# Patient Record
Sex: FEMALE
[De-identification: names, ages, dates, MRNs, and addresses within clinical notes are randomized per-mention and may not be internally consistent; named-entity substitution may affect disease eponyms.]

---

## 2020-08-18 ENCOUNTER — APPOINTMENT (OUTPATIENT)
Dept: ORTHOPEDIC SURGERY | Facility: CLINIC | Age: 70
End: 2020-08-18
Payer: MEDICARE

## 2020-08-18 DIAGNOSIS — M16.11 UNILATERAL PRIMARY OSTEOARTHRITIS, RIGHT HIP: ICD-10-CM

## 2020-08-18 DIAGNOSIS — M16.12 UNILATERAL PRIMARY OSTEOARTHRITIS, LEFT HIP: ICD-10-CM

## 2020-08-18 PROBLEM — Z00.00 ENCOUNTER FOR PREVENTIVE HEALTH EXAMINATION: Status: ACTIVE | Noted: 2020-08-18

## 2020-08-18 PROCEDURE — 99203 OFFICE O/P NEW LOW 30 MIN: CPT

## 2020-08-21 NOTE — PHYSICAL EXAM
[de-identified] : No imaging today.\par  [de-identified] : General: Not in acute distress, dressed appropriately, sitting on examination table\par Skin: Warm and dry, normal turgor, no rashes\par Neurological: AOx3, Cranial nerves grossly in tact\par Psych: Mood and affect appropriate\par \par Right Hip: No swelling edema erythema redness or drainage. Tender anteriorly. ROM: Hip flexion 120, abduction 30, int/ext rotation 45. Painful range of motion. 5/5 strength. Normal gait. \par \par Left Hip: No swelling edema erythema redness or drainage. Tender anteriorly. ROM: Hip flexion 120, abduction 30, int/ext rotation 45. Painful range of motion. 5/5 strength. Normal gait.

## 2020-08-21 NOTE — END OF VISIT
[FreeTextEntry3] : By signing my name below, I, Uma Montemayor, attest that this documentation has been prepared under the direction and in the presence of Lavelle Graham PA-C.\par

## 2020-08-21 NOTE — HISTORY OF PRESENT ILLNESS
[de-identified] : 71 y/o F, here for initial visit of bilateral hip pain. Complaining of pain and stiffness in her hips. Describes pain as a cramping sensation. Pain is worse with walking, bending, and daily living activities, better with rest. Pt went to a orthopedic office 2 months ago where she had x-rays done. Pt wants to schedule a bilateral THR at the end of August. \par

## 2020-08-21 NOTE — ASSESSMENT
[FreeTextEntry1] : Assessment\par Bilateral hip OA\par \par Plan\par Will schedule staged left and right hip replacements\par \par Patient will benefit from the proposed procedure, she has failed a conservative treatment plan of supervised physical therapy, anti-inflammatory medications, and activity modification. She continues to have pain impacting her ability to perform ADL's and has a decreasing QoL. We will schedule this for the earliest mutually convenient time after the appropriate pre-op laboratory tests and clearances are obtained.  All questions were answered and a thorough explanation of RBA were given\par \par F/U when schedules for surgery \par \par All medical record entries made by the PA/Donibkevon/Fellow are at my, Dr. Cristian Marinelli's direction and personally dictated by me on 08/18/2020]. I have reviewed the chart and agree that the record accurately reflects my personal performance of the history, physical exam, assessment, and plan. I have also personally directed reviewed, and agreed with the chart.\par

## 2020-09-16 ENCOUNTER — APPOINTMENT (OUTPATIENT)
Dept: ORTHOPEDIC SURGERY | Facility: CLINIC | Age: 70
End: 2020-09-16
Payer: SUBSIDIZED

## 2020-09-16 PROCEDURE — ZZZZZ: CPT

## 2022-11-09 PROBLEM — Z87.39 HISTORY OF ARTHRITIS: Status: RESOLVED | Noted: 2022-11-09 | Resolved: 2022-11-09

## 2022-11-09 PROBLEM — R22.2 MASS, CHEST: Status: ACTIVE | Noted: 2022-11-09

## 2022-11-10 ENCOUNTER — APPOINTMENT (OUTPATIENT)
Dept: THORACIC SURGERY | Facility: CLINIC | Age: 72
End: 2022-11-10

## 2022-11-10 ENCOUNTER — NON-APPOINTMENT (OUTPATIENT)
Age: 72
End: 2022-11-10

## 2022-11-10 VITALS
OXYGEN SATURATION: 97 % | WEIGHT: 140 LBS | HEART RATE: 64 BPM | DIASTOLIC BLOOD PRESSURE: 70 MMHG | HEIGHT: 63 IN | RESPIRATION RATE: 17 BRPM | BODY MASS INDEX: 24.8 KG/M2 | SYSTOLIC BLOOD PRESSURE: 162 MMHG | TEMPERATURE: 97.3 F

## 2022-11-10 DIAGNOSIS — R22.2 LOCALIZED SWELLING, MASS AND LUMP, TRUNK: ICD-10-CM

## 2022-11-10 DIAGNOSIS — K44.9 DIAPHRAGMATIC HERNIA W/OUT OBSTRUCTION OR GANGRENE: ICD-10-CM

## 2022-11-10 DIAGNOSIS — K21.9 DIAPHRAGMATIC HERNIA W/OUT OBSTRUCTION OR GANGRENE: ICD-10-CM

## 2022-11-10 DIAGNOSIS — Z87.39 PERSONAL HISTORY OF OTHER DISEASES OF THE MUSCULOSKELETAL SYSTEM AND CONNECTIVE TISSUE: ICD-10-CM

## 2022-11-10 PROCEDURE — 99203 OFFICE O/P NEW LOW 30 MIN: CPT

## 2022-11-14 NOTE — ASSESSMENT
[FreeTextEntry1] : A 72 year old Female, with PMHX of arthritis, who is referred by Dr.Spyros Abernathy for an initial visit of an mass in the right costophrenic angle.\par \par The chest imaging was reviewed with the patient. \par \par (Coronary CT chest on 10/31/22\par -There is partially imaged soft tissue mass with calcifications in the region of the right costophrenic angle, which measures 5.5 X 1.9 cm in its imaged portion.\par -Moderate size hiatal hernia and findings suggestive of gastroesophageal reflux.)\par \par The etiology is unclear but likely presents a benign process given the degree of calcification. We will obtain a formal chest CT to better assess the lesion. She will RTC after her chest CT with a telehealth visit if she so chooses. \par \par I also discussed the surgical treatment of hiatal hernia with the patient, including preoperative evaluation such as EGD, Manometry, and Bravo test. Will continue observe at this time. \par \par Plan:\par \par 1. Chest CT without contrast\par 2. In person or Telehealth Visit\par \par VOLODYMYR Parks, was scribe [and  if needed] for and in the presence of Dr. Leticia Donohue for the following sections: history of present illness, past medical/surgical/family/social/ allergy history, review of systems, vital signs, physical exam, and disposition.\par \par Leticia GLEZ MD personally performed the services described in the documentation, reviewed the documentation recorded by the scribe in my presence and it accurately and completely records my words and actions. I have personally seen, examined, and participated in the care of this patient.  I have reviewed all pertinent clinical information, including history and physical exam and plan.  I agree with the above history, physical and plan of the ACP which I have reviewed and edited where appropriate.\par \par

## 2022-11-14 NOTE — HISTORY OF PRESENT ILLNESS
[FreeTextEntry1] : A 72 year old Female, nonsmoker, with PMHX of arthritis, s/p bilateral hip replacement, who is referred by  Dr. Avelina Abernathy for an initial visit of incidental found right costophrenic angle mass on CT CCTA. \par \par Patient had vague chest pain, occasional pressure RUQ of abdomen, cardiac work up revealed abnormal chest. She also admits chronic acid reflux. Denies cough, dyspnea. \par \par CT CCTA on 10/31/22\par -There is partially imaged soft tissue mass with calcifications in the region of the right costophrenic angle, which measures 5.5 X 1.9 cm in its imaged portion.\par -Moderate size hiatal hernia and findings suggestive of gastroesophageal reflux.

## 2022-11-14 NOTE — PHYSICAL EXAM
[General Appearance - Alert] : alert [General Appearance - In No Acute Distress] : in no acute distress [Neck Appearance] : the appearance of the neck was normal [Neck Cervical Mass (___cm)] : no neck mass was observed [Jugular Venous Distention Increased] : there was no jugular-venous distention [Thyroid Diffuse Enlargement] : the thyroid was not enlarged [Thyroid Nodule] : there were no palpable thyroid nodules [] : no respiratory distress [Auscultation Breath Sounds / Voice Sounds] : lungs were clear to auscultation bilaterally [Heart Rate And Rhythm] : heart rate was normal and rhythm regular [Heart Sounds] : normal S1 and S2 [Heart Sounds Gallop] : no gallops [Murmurs] : no murmurs [Heart Sounds Pericardial Friction Rub] : no pericardial rub [Deep Tendon Reflexes (DTR)] : deep tendon reflexes were 2+ and symmetric [Sensation] : the sensory exam was normal to light touch and pinprick [No Focal Deficits] : no focal deficits

## 2022-11-14 NOTE — REVIEW OF SYSTEMS
[As Noted in HPI] : as noted in HPI [Heartburn] : heartburn [Negative] : Endocrine [Vomiting] : no vomiting

## 2022-11-29 ENCOUNTER — APPOINTMENT (OUTPATIENT)
Dept: THORACIC SURGERY | Facility: CLINIC | Age: 72
End: 2022-11-29

## 2022-11-29 PROCEDURE — 99442: CPT

## 2022-11-30 NOTE — DATA REVIEWED
[FreeTextEntry1] : Coronary CT chest on 10/31/22\par -There is partially imaged soft tissue mass with calcifications in the region of the right costophrenic angle, which measures 5.5 X 1.9 cm in its imaged portion.\par -Moderate size hiatal hernia and findings suggestive of gastroesophageal reflux.

## 2022-11-30 NOTE — HISTORY OF PRESENT ILLNESS
[FreeTextEntry1] : This visit was provided via telephone. The patient was located at home at the time of the visit. \par The provider, PAVITHRA POWERS, was located at the medical office located in  at the time of the visit. The patient and Provider participated in the telehealth encounter.\par Verbal consent for telephone services was given by the patient.\par \par 72  year old female, with PMHX of arthritis, who was referred by Dr. Avelina Abernathy for evaluation of a mass in the right costophrenic angle found on CT CCTA. \par \par She presents today via telehealth converted to telephone (due to lack of WIFI for patient) to review the results of a recent chest CT done to better assess the lesion. \par \par (CT chest 11/22/22:\par -6.3 cm x 2.4 x 1.7cm partially calcified subpleural mass anteriorly w/in the right middle lobe in the right cardiophrenic region . this corresponds to the findings on the recent CT coronary angiogram study. \par Differential diagnosis- pleural fibroma, mesothelioma and pleural thymoma. \par The large size of this mass would be atypical for asbestos related to pleural disease and no typical appearing pleural plaques are seen elsewhere in the lung. Can be further evaluated by PET scan. mass is also amenable to CT guided biopsy. Alternatively a follow up imaging with a repeat CT scan of the chest in 3-4 months could be preformed to monitor for stability. \par \par Few small noncalcified and calcified lung nodules with the largest noncalcified nodule a 4 mm nodule within the lateral right middle lobe.\par \par Moderate sized hiatal hernia\par \par 2.4cm x 2cm right adrenal gland adenoma).\par \par She has no complaints. She is to undergo a colonoscopy in the next month. \par \par \par \par

## 2022-11-30 NOTE — ASSESSMENT
[FreeTextEntry1] : 72 year old female, with PMHX of arthritis, who was referred by Dr. Avelina Abernathy for evaluation of a mass in the right costophrenic angle found on CT CCTA. \par \par She presents today via telehealth converted to telephone (due to lack of WIFI for patient) to review the results of a recent chest CT done to better assess the lesion. \par \par (CT chest 11/22/22:\par -6.3 cm x 2.4 x 1.7cm partially calcified subpleural mass anteriorly w/in the right middle lobe in the right cardiophrenic region . this corresponds to the findings on the recent CT coronary angiogram study. \par Differential diagnosis- pleural fibroma, mesothelioma and pleural thymoma. \par The large size of this mass would be atypical for asbestos related to pleural disease and no typical appearing pleural plaques are seen elsewhere in the lung. Can be further evaluated by PET scan. mass is also amenable to CT guided biopsy. Alternatively a follow up imaging with a repeat CT scan of the chest in 3-4 months could be preformed to monitor for stability. \par \par Few small noncalcified and calcified lung nodules with the largest noncalcified nodule a 4 mm nodule within the lateral right middle lobe.\par \par Moderate sized hiatal hernia\par \par 2.4cm x 2cm right adrenal gland adenoma).\par \par She has no complaints. She is to undergo a colonoscopy in the next month. \par \par Chest CT findings reviewed with the patient. Given calcifications in the lesion, it was discussed with the patient that the lesion is likely chronic in nature. She has no symptoms. Etiologies discussed including possible thymoma or fibrous tumor of the pleural. It is unlikely to be pleural mesothelioma. \par \par Her options include\par 1. Repeat imaging with PET-CT/Chest CT in 3 months\par 2. CT guided biopsy\par 3. Robotic Resection\par \par Patient wishes to have it followed with a PET-CT in 3 months.\par \par Plan:\par 1. PET-CT in 3 months\par \par Leticia GLEZ MD personally performed the services described in the documentation, reviewed the documentation recorded by the scribe in my presence and it accurately and completely records my words and actions. I have personally seen, examined, and participated in the care of this patient.  I have reviewed all pertinent clinical information, including history and physical exam and plan.  I agree with the above history, physical and plan of the ACP which I have reviewed and edited where appropriate.\par \par Total time of 12 minutes with > 50% spent with the patient discussing radiologic studies, diagnosis, treatment options, and risks and benefits of CT guided biopsy.\par

## 2023-02-27 ENCOUNTER — NON-APPOINTMENT (OUTPATIENT)
Age: 73
End: 2023-02-27